# Patient Record
Sex: MALE | NOT HISPANIC OR LATINO | Employment: FULL TIME | URBAN - METROPOLITAN AREA
[De-identification: names, ages, dates, MRNs, and addresses within clinical notes are randomized per-mention and may not be internally consistent; named-entity substitution may affect disease eponyms.]

---

## 2024-07-13 ENCOUNTER — ANCILLARY PROCEDURE (OUTPATIENT)
Dept: RADIOLOGY | Facility: CLINIC | Age: 24
End: 2024-07-13
Payer: COMMERCIAL

## 2024-07-17 ENCOUNTER — OFFICE VISIT (OUTPATIENT)
Dept: FAMILY MEDICINE | Facility: CLINIC | Age: 24
End: 2024-07-17
Payer: COMMERCIAL

## 2024-07-17 VITALS
HEIGHT: 66 IN | SYSTOLIC BLOOD PRESSURE: 130 MMHG | DIASTOLIC BLOOD PRESSURE: 77 MMHG | OXYGEN SATURATION: 99 % | WEIGHT: 161.6 LBS | BODY MASS INDEX: 25.97 KG/M2 | RESPIRATION RATE: 16 BRPM | HEART RATE: 63 BPM

## 2024-07-17 DIAGNOSIS — K40.90 LEFT INGUINAL HERNIA: ICD-10-CM

## 2024-07-17 DIAGNOSIS — R07.9 CHEST PAIN, UNSPECIFIED TYPE: Primary | ICD-10-CM

## 2024-07-17 LAB
ATRIAL RATE - MUSE: 59 BPM
DIASTOLIC BLOOD PRESSURE - MUSE: NORMAL MMHG
INTERPRETATION ECG - MUSE: NORMAL
P AXIS - MUSE: 57 DEGREES
PR INTERVAL - MUSE: 152 MS
QRS DURATION - MUSE: 92 MS
QT - MUSE: 378 MS
QTC - MUSE: 374 MS
R AXIS - MUSE: 74 DEGREES
SYSTOLIC BLOOD PRESSURE - MUSE: NORMAL MMHG
T AXIS - MUSE: 27 DEGREES
VENTRICULAR RATE- MUSE: 59 BPM

## 2024-07-17 PROCEDURE — 99203 OFFICE O/P NEW LOW 30 MIN: CPT | Performed by: PHYSICIAN ASSISTANT

## 2024-07-17 PROCEDURE — 93005 ELECTROCARDIOGRAM TRACING: CPT | Performed by: PHYSICIAN ASSISTANT

## 2024-07-17 PROCEDURE — 93010 ELECTROCARDIOGRAM REPORT: CPT | Performed by: INTERNAL MEDICINE

## 2024-07-17 RX ORDER — OXYCODONE AND ACETAMINOPHEN 5; 325 MG/1; MG/1
1-2 TABLET ORAL
Status: ON HOLD | COMMUNITY
Start: 2024-07-13 | End: 2024-07-30

## 2024-07-17 ASSESSMENT — PATIENT HEALTH QUESTIONNAIRE - PHQ9
SUM OF ALL RESPONSES TO PHQ QUESTIONS 1-9: 10
SUM OF ALL RESPONSES TO PHQ QUESTIONS 1-9: 10
10. IF YOU CHECKED OFF ANY PROBLEMS, HOW DIFFICULT HAVE THESE PROBLEMS MADE IT FOR YOU TO DO YOUR WORK, TAKE CARE OF THINGS AT HOME, OR GET ALONG WITH OTHER PEOPLE: NOT DIFFICULT AT ALL

## 2024-07-17 NOTE — PROGRESS NOTES
History:  Naveed Rodriguez is a 24 year old male who was referred to general surgery by Uri Clarke PA-C for an inguinal hernia.  He presents today with complaints of a symptomatic bulge in the left groin.  The bulge has been present for at least 10 years.  It has slowly been enlarging over time.  It will reduce on its own.  It recently became more symptomatic.  He is having increased pain.  He denies having any nausea or vomiting.  He has been taking some narcotics and he is now suffering from constipation.  He has had outpatient workup including a CT.  It demonstrated a large fat-containing left inguinal hernia.    Allergies:  Patient has no known allergies.    Past Medical History:  Denies    Past Surgical History:  Some type of hernia repair at about 3 years old    Medications:    oxyCODONE-acetaminophen (PERCOCET) 5-325 MG tablet, Take 1-2 tablets by mouth, Disp: , Rfl:     Family History:  Denies a known family history of anesthesia problems    Social History:  Denies active alcohol, tobacco, marijuana, and illicit drug use    Review of Systems:   General: No complaints or constitutional symptoms  Skin: No complaints or symptoms   Hematologic/Lymphatic: No symptoms or complaints  Psychiatric: No symptoms or complaints  Endocrine: No excessive fatigue, no hypermetabolic symptoms reported  Respiratory: No cough, shortness of breath, or wheezing  Cardiovascular: No chest pain or dyspnea on exertion  Gastrointestinal: As per HPI  Musculoskeletal: No recent injuries reported  Neurological: No focal neurologic defects reported.      Exam:  /62 (BP Location: Right arm, Patient Position: Sitting)   Wt 72.6 kg (160 lb)   BMI 25.82 kg/m    Body mass index is 25.82 kg/m .  General: Alert, cooperative, appears stated age   Skin: Skin color, texture, turgor normal, no rashes or lesions   Lymphatic: No obvious adenopathy, no swelling   Eyes: No scleral icterus, pupils equal  HENT: No traumatic injury to the  head or face, no gross abnormalities  Lungs: Normal respiratory effort, breath sounds equal bilaterally  Heart: Regular rate and rhythm  Abdomen: No appreciable umbilical or right inguinal hernia.  Clear bulge in the left inguinal region that is soft and reducible when he is laying down.  Musculoskeletal: No obvious swelling  Neurologic: Grossly intact    Imaging:   Pertinent images personally reviewed by myself and discussed with the patient.    Radiology reports:  EXAM: CT ABDOMEN PELVIS W   LOCATION: [facility] [place]   DATE: 7/13/2024     INDICATION: L inguinal hernia   COMPARISON: None.   TECHNIQUE: CT scan of the abdomen and pelvis was performed following injection of IV contrast. Multiplanar reformats were obtained. Dose reduction techniques were used.   CONTRAST: IOPAMIDOL 300 MG/ML  ML BOTTLE: 100mL     FINDINGS:   LOWER CHEST: Unremarkable.   HEPATOBILIARY: Normal liver parenchyma. No biliary dilation. Normal gallbladder.   PANCREAS: Normal.   SPLEEN: Normal.   ADRENAL GLANDS: Normal.   KIDNEYS/BLADDER: Normal kidneys. No hydronephrosis. Normal urinary bladder.   BOWEL: No obstruction. Normal appendix. No bowel wall thickening or pneumatosis. No pneumoperitoneum or free fluid.   LYMPH NODES: Prominent ileocolic lymph nodes, possibly reactive.   VASCULATURE: Nonaneurysmal aorta.   PELVIC ORGANS: No pelvic masses.   MUSCULOSKELETAL: No acute osseous abnormalities. Large fat-containing left inguinal hernia.     My interpretation:  Fat-containing left inguinal hernia    Assessment/Plan:   Naveed Rodriguez is a 24 year old male with a left inguinal hernia.  I have discussed the pathophysiology of inguinal hernias at length as well as the surgical and non-operative management strategies.  In particular, the risks and benefits of repair utilizing mesh vs a primary repair.  Similarly, the risks and benefits to the surgical approach - laparoscopic vs robotic vs open inguinal hernia  surgery.  We discussed the risks of hernia surgery itself which include, but are not limited to, bleeding, infection of the mesh, recurrence of the hernia, chronic pain, poor cosmesis, the need for reoperative intervention, and injury to vital structures.  Additionally, the risks of observation were also discussed in detail which include, but are not limited to, chronic pain, enlargement of the hernia, incarceration, and strangulation.      He understands everything that was discussed and has consented to proceed with surgery.  Postoperative recovery and restrictions were discussed.  I would have him plan on being off of work for 1 week.  It will be okay for him to be on his feet and use stairs.  With the size of his hernia, he can plan on 4 weeks of lifting restrictions.  I would not want him to lift anything more than 20 pounds for the first month of his recovery.  We will plan on scheduling an open left inguinal hernia repair under MAC anesthesia at his desired date.     Sheron Razo DO  General Surgeon  [facility]  Surgery [address]  [address]  [place] [address]  Office: [phone]  Employed by - [employer] [facility] [employer]

## 2024-07-17 NOTE — PROGRESS NOTES
Assessment & Plan     Chest pain, unspecified type  Unclear of exact etiology at this time.  Discussed possible coronary artery disease however given young age, lack of family history, atypical description of chest pains, and other more potential differentials felt less likely.  EKG was obtained for baseline.  Preliminary review shows no concerns.  No signs of ischemia or obvious congenital heart defects.  Consider possible psychosomatic presentation given elevated PHQ-9 and reported stress levels.  Also consider muscular skeletal given description after he returns home and having a physically demanding job with pain worsening with movement of chest and arm at night.  Also given history of GERD, reflux is possible as well.  Given normal EKG, recommending first attempting over-the-counter either Pepcid or omeprazole for 2 to 4 weeks and if no improvement in 2 weeks, patient to return to clinic.  Discussed stress in detail but patient does not feel like he is suffering from depression or anxiety and does not elect to have any treatment at this time.  Denies any thoughts of self-harm.  - EKG 12-lead, tracing only    Left inguinal hernia  Main reason for visit today.  Exam of hernia was not obtained given CT confirming hernia within the last 4 days.  Patient is in need of a surgical referral for consultation and likely surgical repair.  Given mild to moderate pain, recommending against ongoing narcotic use.  May continue Tylenol and/or ibuprofen as needed but warned against ibuprofen in case his above symptoms are secondary to reflux.  Referral placed.  Follow-up as needed.  Otherwise recommending routine wellness visit in the next 3 to 6 months.  - Adult Gen Surg  Referral; Future      Depression Screening Follow Up        7/17/2024    10:08 AM   PHQ   PHQ-9 Total Score 10   Q9: Thoughts of better off dead/self-harm past 2 weeks Not at all         7/17/2024    10:08 AM   Last PHQ-9   1.  Little interest or  pleasure in doing things 2   2.  Feeling down, depressed, or hopeless 2   3.  Trouble falling or staying asleep, or sleeping too much 2   4.  Feeling tired or having little energy 1   5.  Poor appetite or overeating 2   6.  Feeling bad about yourself 1   7.  Trouble concentrating 0   8.  Moving slowly or restless 0   Q9: Thoughts of better off dead/self-harm past 2 weeks 0   PHQ-9 Total Score 10         Follow Up Actions Taken  Crisis resource information provided in After Visit Summary   Patient denies medication or follow-up with therapy at this time.        Chrystal Lucio is a 24 year old, presenting for the following health issues:  Hospital F/U ([facility] from 07/13/2024- pt is doing OK- was given pain medication to help with the pain)        7/17/2024    10:26 AM   Additional Questions   Roomed by Maren Lancaster CMA   Accompanied by Leighann- Mother CORY   Answers submitted by the patient for this visit:  Patient Health Questionnaire (Submitted on 7/17/2024)  If you checked off any problems, how difficult have these problems made it for you to do your work, take care of things at home, or get along with other people?: Not difficult at all  PHQ9 TOTAL SCORE: 10          ED/UC Followup:    [facility]  Date of visit: July 13, 2024  Reason for visit: Left inguinal hernia.  Current Status: Stable.  Was given Percocet which has been taking in managing discomfort.  He states the discomfort is mild to moderate.  No change in symptoms.    Of note, emergency room visit and imaging were both reviewed.  CT showed nonincarcerated left fat-containing inguinal hernia.    Patient is interested in having this repaired    Of note, patient admits to stress but denies any history of depression or anxiety.  However, does run in family.  Does not feel like he is depressed or anxious.    He does however admits to intermittent chest pains after getting off work.  He has a very physically demanding job  "and feels he has centralized chest pain radiating to his left arm which is worse with movement when he gets home from work and is relaxed.  Does admit to ongoing increased stomach acid and has not attempted any medications for this.  No known family history of coronary artery disease.  No history of nicotine smoking but has smoked cannabis in the past.  No longer smoking and has not smoked this for 1.5 weeks.  Denies pain getting worse with activity but does state seems more sore with movement of chest and arm when occurs.  No pain while at work doing activities.  No shortness of breath.  No palpitations.            Objective    /77 (BP Location: Left arm, Patient Position: Sitting, Cuff Size: Adult Regular)   Pulse 63   Resp 16   Ht 1.676 m (5' 6\")   Wt 73.3 kg (161 lb 9.6 oz)   SpO2 99%   BMI 26.08 kg/m    Body mass index is 26.08 kg/m .  Physical Exam   GENERAL: alert and no distress  RESP: lungs clear to auscultation - no rales, rhonchi or wheezes  CV: regular rates and rhythm, normal S1 S2, no S3 or S4, and no murmur, click or rub  PSYCH: mentation appears normal, affect normal/bright    EKG - Reviewed and interpreted by me appears normal, NSR, normal axis, normal intervals, no acute ST/T changes c/w ischemia, no LVH by voltage criteria, there are no prior tracings available        Signed Electronically by: Uri Clarke PA-C    "

## 2024-07-17 NOTE — H&P (VIEW-ONLY)
History:  Naveed Rodriguez is a 24 year old male who was referred to general surgery by Uri Clarke PA-C for an inguinal hernia.  He presents today with complaints of a symptomatic bulge in the left groin.  The bulge has been present for at least 10 years.  It has slowly been enlarging over time.  It will reduce on its own.  It recently became more symptomatic.  He is having increased pain.  He denies having any nausea or vomiting.  He has been taking some narcotics and he is now suffering from constipation.  He has had outpatient workup including a CT.  It demonstrated a large fat-containing left inguinal hernia.    Allergies:  Patient has no known allergies.    Past Medical History:  Denies    Past Surgical History:  Some type of hernia repair at about 3 years old    Medications:    oxyCODONE-acetaminophen (PERCOCET) 5-325 MG tablet, Take 1-2 tablets by mouth, Disp: , Rfl:     Family History:  Denies a known family history of anesthesia problems    Social History:  Denies active alcohol, tobacco, marijuana, and illicit drug use    Review of Systems:   General: No complaints or constitutional symptoms  Skin: No complaints or symptoms   Hematologic/Lymphatic: No symptoms or complaints  Psychiatric: No symptoms or complaints  Endocrine: No excessive fatigue, no hypermetabolic symptoms reported  Respiratory: No cough, shortness of breath, or wheezing  Cardiovascular: No chest pain or dyspnea on exertion  Gastrointestinal: As per HPI  Musculoskeletal: No recent injuries reported  Neurological: No focal neurologic defects reported.      Exam:  /62 (BP Location: Right arm, Patient Position: Sitting)   Wt 72.6 kg (160 lb)   BMI 25.82 kg/m    Body mass index is 25.82 kg/m .  General: Alert, cooperative, appears stated age   Skin: Skin color, texture, turgor normal, no rashes or lesions   Lymphatic: No obvious adenopathy, no swelling   Eyes: No scleral icterus, pupils equal  HENT: No traumatic injury to the  head or face, no gross abnormalities  Lungs: Normal respiratory effort, breath sounds equal bilaterally  Heart: Regular rate and rhythm  Abdomen: No appreciable umbilical or right inguinal hernia.  Clear bulge in the left inguinal region that is soft and reducible when he is laying down.  Musculoskeletal: No obvious swelling  Neurologic: Grossly intact    Imaging:   Pertinent images personally reviewed by myself and discussed with the patient.    Radiology reports:  EXAM: CT ABDOMEN PELVIS W   [facility]ATION[facility] [place]   DATE: 7/13/2024     INDICATION: L inguinal hernia   COMPARISON: None.   TECHNIQUE: CT scan of the abdomen and pelvis was performed following injection of IV contrast. Multiplanar reformats were obtained. Dose reduction techniques were used.   CONTRAST: IOPAMIDOL 300 MG/ML  ML BOTTLE: 100mL     FINDINGS:   LOWER CHEST: Unremarkable.   HEPATOBILIARY: Normal liver parenchyma. No biliary dilation. Normal gallbladder.   PANCREAS: Normal.   SPLEEN: Normal.   ADRENAL GLANDS: Normal.   KIDNEYS/BLADDER: Normal kidneys. No hydronephrosis. Normal urinary bladder.   BOWEL: No obstruction. Normal appendix. No bowel wall thickening or pneumatosis. No pneumoperitoneum or free fluid.   LYMPH NODES: Prominent ileocolic lymph nodes, possibly reactive.   VASCULATURE: Nonaneurysmal aorta.   PELVIC ORGANS: No pelvic masses.   MUSCULOSKELETAL: No acute osseous abnormalities. Large fat-containing left inguinal hernia.     My interpretation:  Fat-containing left inguinal hernia    Assessment/Plan:   Naveed Rodriguez is a 24 year old male with a left inguinal hernia.  I have discussed the pathophysiology of inguinal hernias at length as well as the surgical and non-operative management strategies.  In particular, the risks and benefits of repair utilizing mesh vs a primary repair.  Similarly, the risks and benefits to the surgical approach - laparoscopic vs robotic vs open inguinal hernia  surgery.  We discussed the risks of hernia surgery itself which include, but are not limited to, bleeding, infection of the mesh, recurrence of the hernia, chronic pain, poor cosmesis, the need for reoperative intervention, and injury to vital structures.  Additionally, the risks of observation were also discussed in detail which include, but are not limited to, chronic pain, enlargement of the hernia, incarceration, and strangulation.      He understands everything that was discussed and has consented to proceed with surgery.  Postoperative recovery and restrictions were discussed.  I would have him plan on being off of work for 1 week.  It will be okay for him to be on his feet and use stairs.  With the size of his hernia, he can plan on 4 weeks of lifting restrictions.  I would not want him to lift anything more than 20 pounds for the first month of his recovery.  We will plan on scheduling an open left inguinal hernia repair under MAC anesthesia at his desired date.     Sheron Razo DO  General Surgeon  [facility]  Surgery [address]  [address]  [place] [address]  Office: [phone]  Employed by - [facility]

## 2024-07-18 ENCOUNTER — OFFICE VISIT (OUTPATIENT)
Dept: SURGERY | Facility: CLINIC | Age: 24
End: 2024-07-18
Payer: COMMERCIAL

## 2024-07-18 VITALS — SYSTOLIC BLOOD PRESSURE: 106 MMHG | BODY MASS INDEX: 25.82 KG/M2 | DIASTOLIC BLOOD PRESSURE: 62 MMHG | WEIGHT: 160 LBS

## 2024-07-18 DIAGNOSIS — K40.90 LEFT INGUINAL HERNIA: ICD-10-CM

## 2024-07-18 PROCEDURE — 99244 OFF/OP CNSLTJ NEW/EST MOD 40: CPT | Performed by: SURGERY

## 2024-07-18 RX ORDER — CEFAZOLIN SODIUM/WATER 2 G/20 ML
2 SYRINGE (ML) INTRAVENOUS
Status: CANCELLED | OUTPATIENT
Start: 2024-07-30

## 2024-07-18 NOTE — LETTER
7/18/2024      Naveed Rodriguez  [address] [address]      Dear Colleague,    Thank you for referring your patient, Naveed Rodriguez, to the [facility] [place]. Please see a copy of my visit note below.    History:  Naveed Rodriguez is a 24 year old male who was referred to general surgery by Uri Clarke PA-C for an inguinal hernia.  He presents today with complaints of a symptomatic bulge in the left groin.  The bulge has been present for at least 10 years.  It has slowly been enlarging over time.  It will reduce on its own.  It recently became more symptomatic.  He is having increased pain.  He denies having any nausea or vomiting.  He has been taking some narcotics and he is now suffering from constipation.  He has had outpatient workup including a CT.  It demonstrated a large fat-containing left inguinal hernia.    Allergies:  Patient has no known allergies.    Past Medical History:  Denies    Past Surgical History:  Some type of hernia repair at about 3 years old    Medications:     oxyCODONE-acetaminophen (PERCOCET) 5-325 MG tablet, Take 1-2 tablets by mouth, Disp: , Rfl:     Family History:  Denies a known family history of anesthesia problems    Social History:  Denies active alcohol, tobacco, marijuana, and illicit drug use    Review of Systems:   General: No complaints or constitutional symptoms  Skin: No complaints or symptoms   Hematologic/Lymphatic: No symptoms or complaints  Psychiatric: No symptoms or complaints  Endocrine: No excessive fatigue, no hypermetabolic symptoms reported  Respiratory: No cough, shortness of breath, or wheezing  Cardiovascular: No chest pain or dyspnea on exertion  Gastrointestinal: As per HPI  Musculoskeletal: No recent injuries reported  Neurological: No focal neurologic defects reported.      Exam:  /62 (BP Location: Right arm, Patient Position: Sitting)   Wt 72.6 kg (160 lb)   BMI 25.82  kg/m    Body mass index is 25.82 kg/m .  General: Alert, cooperative, appears stated age   Skin: Skin color, texture, turgor normal, no rashes or lesions   Lymphatic: No obvious adenopathy, no swelling   Eyes: No scleral icterus, pupils equal  HENT: No traumatic injury to the head or face, no gross abnormalities  Lungs: Normal respiratory effort, breath sounds equal bilaterally  Heart: Regular rate and rhythm  Abdomen: No appreciable umbilical or right inguinal hernia.  Clear bulge in the left inguinal region that is soft and reducible when he is laying down.  Musculoskeletal: No obvious swelling  Neurologic: Grossly intact    Imaging:   Pertinent images personally reviewed by myself and discussed with the patient.    Radiology reports:  EXAM: CT ABDOMEN PELVIS W   LOCATION: [facility] [place]   DATE: 7/13/2024     INDICATION: L inguinal hernia   COMPARISON: None.   TECHNIQUE: CT scan of the abdomen and pelvis was performed following injection of IV contrast. Multiplanar reformats were obtained. Dose reduction techniques were used.   CONTRAST: IOPAMIDOL 300 MG/ML  ML BOTTLE: 100mL     FINDINGS:   LOWER CHEST: Unremarkable.   HEPATOBILIARY: Normal liver parenchyma. No biliary dilation. Normal gallbladder.   PANCREAS: Normal.   SPLEEN: Normal.   ADRENAL GLANDS: Normal.   KIDNEYS/BLADDER: Normal kidneys. No hydronephrosis. Normal urinary bladder.   BOWEL: No obstruction. Normal appendix. No bowel wall thickening or pneumatosis. No pneumoperitoneum or free fluid.   LYMPH NODES: Prominent ileocolic lymph nodes, possibly reactive.   VASCULATURE: Nonaneurysmal aorta.   PELVIC ORGANS: No pelvic masses.   MUSCULOSKELETAL: No acute osseous abnormalities. Large fat-containing left inguinal hernia.     My interpretation:  Fat-containing left inguinal hernia    Assessment/Plan:   Naveed Rodriguez is a 24 year old male with a left inguinal hernia.  I have discussed the pathophysiology of inguinal hernias  at length as well as the surgical and non-operative management strategies.  In particular, the risks and benefits of repair utilizing mesh vs a primary repair.  Similarly, the risks and benefits to the surgical approach - laparoscopic vs robotic vs open inguinal hernia surgery.  We discussed the risks of hernia surgery itself which include, but are not limited to, bleeding, infection of the mesh, recurrence of the hernia, chronic pain, poor cosmesis, the need for reoperative intervention, and injury to vital structures.  Additionally, the risks of observation were also discussed in detail which include, but are not limited to, chronic pain, enlargement of the hernia, incarceration, and strangulation.      He understands everything that was discussed and has consented to proceed with surgery.  Postoperative recovery and restrictions were discussed.  I would have him plan on being off of work for 1 week.  It will be okay for him to be on his feet and use stairs.  With the size of his hernia, he can plan on 4 weeks of lifting restrictions.  I would not want him to lift anything more than 20 pounds for the first month of his recovery.  We will plan on scheduling an open left inguinal hernia repair under MAC anesthesia at his desired date.     Sheron Razo DO  General Surgeon  [facility]  Surgery [address] [address]  Office: [phone]  Employed by - [facility]       Again, thank you for allowing me to participate in the care of your patient.        Sincerely,        Sheron Razo DO

## 2024-07-27 ENCOUNTER — HEALTH MAINTENANCE LETTER (OUTPATIENT)
Age: 24
End: 2024-07-27

## 2024-07-29 ENCOUNTER — ANESTHESIA EVENT (OUTPATIENT)
Dept: SURGERY | Facility: CLINIC | Age: 24
End: 2024-07-29
Payer: COMMERCIAL

## 2024-07-30 ENCOUNTER — HOSPITAL ENCOUNTER (OUTPATIENT)
Facility: CLINIC | Age: 24
Discharge: HOME | End: 2024-07-30
Attending: SURGERY | Admitting: SURGERY
Payer: COMMERCIAL

## 2024-07-30 ENCOUNTER — ANESTHESIA (OUTPATIENT)
Dept: SURGERY | Facility: CLINIC | Age: 24
End: 2024-07-30
Payer: COMMERCIAL

## 2024-07-30 VITALS
SYSTOLIC BLOOD PRESSURE: 115 MMHG | TEMPERATURE: 97.4 F | WEIGHT: 160 LBS | OXYGEN SATURATION: 100 % | HEIGHT: 66 IN | HEART RATE: 62 BPM | RESPIRATION RATE: 16 BRPM | DIASTOLIC BLOOD PRESSURE: 73 MMHG | BODY MASS INDEX: 25.71 KG/M2

## 2024-07-30 DIAGNOSIS — G89.18 POSTOPERATIVE PAIN: Primary | ICD-10-CM

## 2024-07-30 PROCEDURE — 258N000003 HC RX IP 258 OP 636: Performed by: STUDENT IN AN ORGANIZED HEALTH CARE EDUCATION/TRAINING PROGRAM

## 2024-07-30 PROCEDURE — 250N000011 HC RX IP 250 OP 636: Performed by: SURGERY

## 2024-07-30 PROCEDURE — 710N000012 HC RECOVERY PHASE 2, PER MINUTE: Performed by: SURGERY

## 2024-07-30 PROCEDURE — 250N000011 HC RX IP 250 OP 636: Performed by: ANESTHESIOLOGY

## 2024-07-30 PROCEDURE — C1781 MESH (IMPLANTABLE): HCPCS | Performed by: SURGERY

## 2024-07-30 PROCEDURE — 250N000009 HC RX 250: Performed by: NURSE ANESTHETIST, CERTIFIED REGISTERED

## 2024-07-30 PROCEDURE — 360N000075 HC SURGERY LEVEL 2, PER MIN: Performed by: SURGERY

## 2024-07-30 PROCEDURE — 272N000001 HC OR GENERAL SUPPLY STERILE: Performed by: SURGERY

## 2024-07-30 PROCEDURE — 258N000003 HC RX IP 258 OP 636: Performed by: NURSE ANESTHETIST, CERTIFIED REGISTERED

## 2024-07-30 PROCEDURE — 250N000011 HC RX IP 250 OP 636: Performed by: NURSE ANESTHETIST, CERTIFIED REGISTERED

## 2024-07-30 PROCEDURE — 370N000017 HC ANESTHESIA TECHNICAL FEE, PER MIN: Performed by: SURGERY

## 2024-07-30 PROCEDURE — C9290 INJ, BUPIVACAINE LIPOSOME: HCPCS | Performed by: ANESTHESIOLOGY

## 2024-07-30 PROCEDURE — 49505 PRP I/HERN INIT REDUC >5 YR: CPT | Mod: LT | Performed by: SURGERY

## 2024-07-30 PROCEDURE — 999N000141 HC STATISTIC PRE-PROCEDURE NURSING ASSESSMENT: Performed by: SURGERY

## 2024-07-30 PROCEDURE — 250N000009 HC RX 250: Performed by: SURGERY

## 2024-07-30 PROCEDURE — 250N000013 HC RX MED GY IP 250 OP 250 PS 637: Performed by: ANESTHESIOLOGY

## 2024-07-30 DEVICE — PERFIX PLUG, LARGE (CONTENTS: 2)
Type: IMPLANTABLE DEVICE | Site: ABDOMEN | Status: FUNCTIONAL
Brand: PERFIX

## 2024-07-30 RX ORDER — OXYCODONE HYDROCHLORIDE 5 MG/1
10 TABLET ORAL
Status: DISCONTINUED | OUTPATIENT
Start: 2024-07-30 | End: 2024-07-30 | Stop reason: HOSPADM

## 2024-07-30 RX ORDER — OXYCODONE HYDROCHLORIDE 5 MG/1
5 TABLET ORAL
Status: COMPLETED | OUTPATIENT
Start: 2024-07-30 | End: 2024-07-30

## 2024-07-30 RX ORDER — CEFAZOLIN SODIUM/WATER 2 G/20 ML
2 SYRINGE (ML) INTRAVENOUS
Status: COMPLETED | OUTPATIENT
Start: 2024-07-30 | End: 2024-07-30

## 2024-07-30 RX ORDER — NALOXONE HYDROCHLORIDE 0.4 MG/ML
0.1 INJECTION, SOLUTION INTRAMUSCULAR; INTRAVENOUS; SUBCUTANEOUS
Status: DISCONTINUED | OUTPATIENT
Start: 2024-07-30 | End: 2024-07-30 | Stop reason: HOSPADM

## 2024-07-30 RX ORDER — FENTANYL CITRATE 50 UG/ML
INJECTION, SOLUTION INTRAMUSCULAR; INTRAVENOUS PRN
Status: DISCONTINUED | OUTPATIENT
Start: 2024-07-30 | End: 2024-07-30

## 2024-07-30 RX ORDER — SODIUM CHLORIDE, SODIUM LACTATE, POTASSIUM CHLORIDE, CALCIUM CHLORIDE 600; 310; 30; 20 MG/100ML; MG/100ML; MG/100ML; MG/100ML
INJECTION, SOLUTION INTRAVENOUS CONTINUOUS
Status: DISCONTINUED | OUTPATIENT
Start: 2024-07-30 | End: 2024-07-30 | Stop reason: HOSPADM

## 2024-07-30 RX ORDER — BUPIVACAINE HYDROCHLORIDE 2.5 MG/ML
INJECTION, SOLUTION EPIDURAL; INFILTRATION; INTRACAUDAL PRN
Status: DISCONTINUED | OUTPATIENT
Start: 2024-07-30 | End: 2024-07-30

## 2024-07-30 RX ORDER — FENTANYL CITRATE 50 UG/ML
25 INJECTION, SOLUTION INTRAMUSCULAR; INTRAVENOUS
Status: DISCONTINUED | OUTPATIENT
Start: 2024-07-30 | End: 2024-07-30 | Stop reason: HOSPADM

## 2024-07-30 RX ORDER — FENTANYL CITRATE 50 UG/ML
25-100 INJECTION, SOLUTION INTRAMUSCULAR; INTRAVENOUS
Status: DISCONTINUED | OUTPATIENT
Start: 2024-07-30 | End: 2024-07-30 | Stop reason: HOSPADM

## 2024-07-30 RX ORDER — PROPOFOL 10 MG/ML
INJECTION, EMULSION INTRAVENOUS PRN
Status: DISCONTINUED | OUTPATIENT
Start: 2024-07-30 | End: 2024-07-30

## 2024-07-30 RX ORDER — DEXMEDETOMIDINE HYDROCHLORIDE 4 UG/ML
INJECTION, SOLUTION INTRAVENOUS
Status: DISCONTINUED
Start: 2024-07-30 | End: 2024-07-30 | Stop reason: HOSPADM

## 2024-07-30 RX ORDER — HYDROCODONE BITARTRATE AND ACETAMINOPHEN 5; 325 MG/1; MG/1
1 TABLET ORAL EVERY 4 HOURS PRN
Qty: 20 TABLET | Refills: 0 | Status: SHIPPED | OUTPATIENT
Start: 2024-07-30

## 2024-07-30 RX ORDER — ONDANSETRON 2 MG/ML
4 INJECTION INTRAMUSCULAR; INTRAVENOUS EVERY 30 MIN PRN
Status: DISCONTINUED | OUTPATIENT
Start: 2024-07-30 | End: 2024-07-30 | Stop reason: HOSPADM

## 2024-07-30 RX ORDER — ONDANSETRON 4 MG/1
4 TABLET, ORALLY DISINTEGRATING ORAL EVERY 30 MIN PRN
Status: DISCONTINUED | OUTPATIENT
Start: 2024-07-30 | End: 2024-07-30 | Stop reason: HOSPADM

## 2024-07-30 RX ORDER — LIDOCAINE 40 MG/G
CREAM TOPICAL
Status: DISCONTINUED | OUTPATIENT
Start: 2024-07-30 | End: 2024-07-30 | Stop reason: HOSPADM

## 2024-07-30 RX ORDER — DEXAMETHASONE SODIUM PHOSPHATE 4 MG/ML
4 INJECTION, SOLUTION INTRA-ARTICULAR; INTRALESIONAL; INTRAMUSCULAR; INTRAVENOUS; SOFT TISSUE
Status: DISCONTINUED | OUTPATIENT
Start: 2024-07-30 | End: 2024-07-30 | Stop reason: HOSPADM

## 2024-07-30 RX ORDER — MAGNESIUM HYDROXIDE 1200 MG/15ML
LIQUID ORAL PRN
Status: DISCONTINUED | OUTPATIENT
Start: 2024-07-30 | End: 2024-07-30 | Stop reason: HOSPADM

## 2024-07-30 RX ORDER — KETOROLAC TROMETHAMINE 30 MG/ML
30 INJECTION, SOLUTION INTRAMUSCULAR; INTRAVENOUS EVERY 6 HOURS PRN
Status: DISCONTINUED | OUTPATIENT
Start: 2024-07-30 | End: 2024-07-30 | Stop reason: HOSPADM

## 2024-07-30 RX ORDER — PROPOFOL 10 MG/ML
INJECTION, EMULSION INTRAVENOUS CONTINUOUS PRN
Status: DISCONTINUED | OUTPATIENT
Start: 2024-07-30 | End: 2024-07-30

## 2024-07-30 RX ORDER — ONDANSETRON 2 MG/ML
INJECTION INTRAMUSCULAR; INTRAVENOUS PRN
Status: DISCONTINUED | OUTPATIENT
Start: 2024-07-30 | End: 2024-07-30

## 2024-07-30 RX ADMIN — MIDAZOLAM 2 MG: 1 INJECTION INTRAMUSCULAR; INTRAVENOUS at 07:23

## 2024-07-30 RX ADMIN — FENTANYL CITRATE 50 MCG: 50 INJECTION INTRAMUSCULAR; INTRAVENOUS at 07:23

## 2024-07-30 RX ADMIN — DEXMEDETOMIDINE HYDROCHLORIDE 12 MCG: 100 INJECTION, SOLUTION INTRAVENOUS at 07:24

## 2024-07-30 RX ADMIN — ONDANSETRON 4 MG: 2 INJECTION INTRAMUSCULAR; INTRAVENOUS at 08:35

## 2024-07-30 RX ADMIN — PROPOFOL 70 MG: 10 INJECTION, EMULSION INTRAVENOUS at 07:23

## 2024-07-30 RX ADMIN — FENTANYL CITRATE 100 MCG: 50 INJECTION, SOLUTION INTRAMUSCULAR; INTRAVENOUS at 06:53

## 2024-07-30 RX ADMIN — DEXMEDETOMIDINE HYDROCHLORIDE 12 MCG: 100 INJECTION, SOLUTION INTRAVENOUS at 08:00

## 2024-07-30 RX ADMIN — SODIUM CHLORIDE, POTASSIUM CHLORIDE, SODIUM LACTATE AND CALCIUM CHLORIDE: 600; 310; 30; 20 INJECTION, SOLUTION INTRAVENOUS at 06:53

## 2024-07-30 RX ADMIN — BUPIVACAINE 10 ML: 13.3 INJECTION, SUSPENSION, LIPOSOMAL INFILTRATION at 06:57

## 2024-07-30 RX ADMIN — KETOROLAC TROMETHAMINE 15 MG: 30 INJECTION, SOLUTION INTRAMUSCULAR at 09:26

## 2024-07-30 RX ADMIN — Medication 2 G: at 07:19

## 2024-07-30 RX ADMIN — OXYCODONE 5 MG: 5 TABLET ORAL at 09:41

## 2024-07-30 RX ADMIN — PROPOFOL 150 MCG/KG/MIN: 10 INJECTION, EMULSION INTRAVENOUS at 07:23

## 2024-07-30 RX ADMIN — BUPIVACAINE HYDROCHLORIDE 20 ML: 2.5 INJECTION, SOLUTION EPIDURAL; INFILTRATION; INTRACAUDAL at 06:57

## 2024-07-30 RX ADMIN — SODIUM CHLORIDE, POTASSIUM CHLORIDE, SODIUM LACTATE AND CALCIUM CHLORIDE: 600; 310; 30; 20 INJECTION, SOLUTION INTRAVENOUS at 08:00

## 2024-07-30 RX ADMIN — MIDAZOLAM HYDROCHLORIDE 2 MG: 1 INJECTION, SOLUTION INTRAMUSCULAR; INTRAVENOUS at 06:53

## 2024-07-30 ASSESSMENT — ACTIVITIES OF DAILY LIVING (ADL)
ADLS_ACUITY_SCORE: 29

## 2024-07-30 NOTE — PROGRESS NOTES
Pt and family verbalize good understanding of discharge teach and follow up with MD.   VSS,Surgical incision CDI. D/C criteria met. Pt verbalizes readiness to go home.   Pt ambulated to rest room wit RN stand by. Pt states normal void.   Home with mother.  @[facility]@ 7/30/2024 10:17 AM

## 2024-07-30 NOTE — OP NOTE
Name:  Naveed Polk Michael  PCP:  Uri Clarke  Procedure Date:  7/30/2024      OPEN LEFT INGUINAL HERNIA REPAIR WITH MESH      Pre-Procedure Diagnosis:  Left inguinal hernia    Post-Procedure Diagnosis:    Left indirect inguinal hernia    Anesthesia Type:  MAC with TAP block    Estimated Blood Loss:   3 mL    Specimens:    None    Complications:    None apparent    Indication for procedure:  This is a 24-year-old male with a bulge in the left inguinal region.  It has been present for at least 10 years.  It has been enlarging over time.  He was diagnosed with a fat-containing inguinal hernia.  He has elected for operative intervention for treatment.    Operative Narrative:    After informed consent was obtained, TAP block performed by LAWRENCE, and the risks and benefits of the procedure were discussed, the patient was taken to the operating room and placed in a supine position.  Monitored anesthesia control was instituted by the anesthesia staff.  Preoperative antibiotics were administered and a time-out was performed.  The patient was then prepped and draped in the usual sterile manner.  An incision was made in the left inguinal region.  This was taken down through Gasper's fascia to the external oblique fascia.  The external oblique aponeurosis was incised in the direction of its fibers and lengthened to the external ring.  Flaps were created in the external oblique aponeurosis.  The ilioinguinal nerve was identified along the length of the canal.  It was freed and a neurectomy was performed to help prevent chronic postoperative pain.  The cord was circumscribed and isolated with a Penrose.  The cord structures were evaluated and an indirect hernia sac was identified along with significantly thickened cremasteric muscles.  The hernia sac was dissected away from the cord.  The hernia sac was entered and found to have omentum within.  The omentum was reduced and the hernia sac was ligated at the internal ring  and discarded.  A plug and patch mesh was obtained.  The overlay was placed along the floor of the canal parallel to the inguinal ligament.  It wrapped around the cord structures to re-create the internal ring.  The mesh was secured in place with 2-0 Ethibond to the pubic tubercle, conjoined tendon, and to the iliopubic tract.  The tails of the mesh were tucked under the external oblique fascia.  The external oblique aponeurosis was then closed in a running fashion with 3-0 Vicryl.  Gasper's fascia was closed with a running 3-0 Vicryl.  The incision was closed with interrupted 4-0 Vicryl, followed by a running subcuticular 4-0 Monocryl.  Steri-Strips and sterile dressings were applied.      Disposition:  All sponge and needle counts were correct.  The patient tolerated the procedure well and was transferred to the postanesthesia care unit in stable condition.     Sheron Razo DO  General Surgeon  [facility]  Surgery [address] [address]  Office: [phone]  Employed by - [facility] [employer] [facility] [employer]

## 2024-07-30 NOTE — ANESTHESIA POSTPROCEDURE EVALUATION
Patient: Naveed Rodriguez    Procedure: Procedure(s):  HERNIORRHAPHY, INGUINAL, OPEN       Anesthesia Type:  MAC    Note:  Disposition: Outpatient   Postop Pain Control: Uneventful            Sign Out: Well controlled pain   PONV: No   Neuro/Psych: Uneventful            Sign Out: Acceptable/Baseline neuro status   Airway/Respiratory: Uneventful            Sign Out: Acceptable/Baseline resp. status   CV/Hemodynamics: Uneventful            Sign Out: Acceptable CV status; No obvious hypovolemia; No obvious fluid overload   Other NRE:    DID A NON-ROUTINE EVENT OCCUR? No           Last vitals:  Vitals Value Taken Time   /73 07/30/24 1000   Temp 36.3  C (97.4  F) 07/30/24 1000   Pulse 62 07/30/24 1000   Resp 16 07/30/24 1000   SpO2 100 % 07/30/24 1000       Electronically Signed By: Jeanne Silvestre MD  July 30, 2024  12:13 PM

## 2024-07-30 NOTE — ANESTHESIA PREPROCEDURE EVALUATION
"Anesthesia Pre-Procedure Evaluation    Patient: Naveed Rodriguez   MRN: [id number] : 2000        Procedure : Procedure(s):  HERNIORRHAPHY, INGUINAL, OPEN          History reviewed. No pertinent past medical history.   Past Surgical History:   Procedure Laterality Date    HERNIA REPAIR      unknown type age 3-4      No Known Allergies   Social History     Tobacco Use    Smoking status: Never    Smokeless tobacco: Never   Substance Use Topics    Alcohol use: Not Currently      Wt Readings from Last 1 Encounters:   24 72.6 kg (160 lb)        Anesthesia Evaluation   Pt has had prior anesthetic.     No history of anesthetic complications       ROS/MED HX  ENT/Pulmonary:  - neg pulmonary ROS     Neurologic:  - neg neurologic ROS     Cardiovascular:  - neg cardiovascular ROS     METS/Exercise Tolerance: >4 METS    Hematologic:  - neg hematologic  ROS     Musculoskeletal: Comment: hernia      GI/Hepatic: Comment: Inguinal hernia      Renal/Genitourinary:  - neg Renal ROS     Endo:  - neg endo ROS     Psychiatric/Substance Use:  - neg psychiatric ROS     Infectious Disease:  - neg infectious disease ROS     Malignancy:       Other:            Physical Exam    Airway        Mallampati: III   TM distance: > 3 FB   Neck ROM: full   Mouth opening: > 3 cm    Respiratory Devices and Support         Dental       (+) Minor Abnormalities - some fillings, tiny chips      Cardiovascular          Rhythm and rate: regular     Pulmonary           breath sounds clear to auscultation           OUTSIDE LABS:  CBC:   Lab Results   Component Value Date    WBC 17.4 (H) 2021    HGB 17.0 2021    HCT 48.4 2021     2021     BMP:   Lab Results   Component Value Date     2021    POTASSIUM 3.6 2021    CHLORIDE 103 2021    CO2 21 (L) 2021    BUN 19 2021    CR 0.80 2021    GLC 91 2021     COAGS: No results found for: \"PTT\", \"INR\", \"FIBR\"  POC: No results " "found for: \"BGM\", \"HCG\", \"HCGS\"  HEPATIC:   Lab Results   Component Value Date    ALBUMIN 5.5 (H) 02/21/2021    PROTTOTAL 8.5 (H) 02/21/2021    ALT 19 02/21/2021    AST 27 02/21/2021    ALKPHOS 86 02/21/2021    BILITOTAL 2.0 (H) 02/21/2021     OTHER:   Lab Results   Component Value Date    CLINT 10.3 02/21/2021    LIPASE 16 02/21/2021       Anesthesia Plan    ASA Status:  1    NPO Status:  NPO Appropriate    Anesthesia Type: MAC.   Induction: N/a.   Maintenance: TIVA.        Consents    Anesthesia Plan(s) and associated risks, benefits, and realistic alternatives discussed. Questions answered and patient/representative(s) expressed understanding.     - Discussed:     - Discussed with:  Patient            Postoperative Care    Pain management: Peripheral nerve block (Single Shot).   PONV prophylaxis: Ondansetron (or other 5HT-3), Dexamethasone or Solumedrol     Comments:    Other Comments:     Propofol gtt  Robinul  Lidocaine  Zofran, decadron 4 mg  LMA prn                 Jeanne Silvestre MD                # Overweight: Estimated body mass index is 25.82 kg/m  as calculated from the following:    Height as of 7/17/24: 1.676 m (5' 6\").    Weight as of 7/18/24: 72.6 kg (160 lb).      "

## 2024-07-30 NOTE — DISCHARGE INSTRUCTIONS
You received an IV medication today called Toradol. You received this medication at 9:20am . This is a NSAID. Therefore, do not take any NSAIDS (Ibuprofen products, Advil, Motrin, etc) until 3:20 pm .          If you have any questions or concerns regarding your procedure, please contact Dr. Sheron Razo, her office number is [phone].

## 2024-07-30 NOTE — ANESTHESIA PROCEDURE NOTES
"TAP Procedure Note    Pre-Procedure   Staff -        Anesthesiologist:  Jeanne Silvestre MD       Performed By: anesthesiologist       Location: pre-op       Procedure Start/Stop Times: 7/30/2024 6:53 AM and 7/30/2024 6:57 AM       Pre-Anesthestic Checklist: patient identified, IV checked, site marked, risks and benefits discussed, informed consent, monitors and equipment checked, pre-op evaluation, at physician/surgeon's request and post-op pain management  Timeout:       Correct Patient: Yes        Correct Procedure: Yes        Correct Site: Yes        Correct Position: Yes        Correct Laterality: Yes        Site Marked: Yes  Procedure Documentation  Procedure: TAP       Laterality: left       Patient Position: supine       Patient Prep/Sterile Barriers: sterile gloves, mask       Skin prep: Chloraprep       Needle Gauge: 20.        Needle Length (Inches): 4        Ultrasound guided       1. Ultrasound was used to identify targeted nerve, plexus, vascular marker, or fascial plane and place a needle adjacent to it in real-time.       2. Ultrasound was used to visualize the spread of anesthetic in close proximity to the above referenced structure.       3. A permanent image is entered into the patient's record.       4. The visualized anatomic structures appeared normal.       5. There were no apparent abnormal pathologic findings.    Assessment/Narrative         The placement was negative for: blood aspirated and painful injection       Paresthesias: No.       Bolus given via needle..        Secured via.        Insertion/Infusion Method: Single Shot       Complications: none    Medication(s) Administered   Medication Administration Time: 7/30/2024 6:53 AM      FOR [facility] ONLY:   Pain Team Contact information: please page the Pain Team Via [employer]. Search \"Pain\". During daytime hours, please page the attending first. At night please page the resident first.      "

## 2024-07-30 NOTE — INTERVAL H&P NOTE
Patient seen in pre-op.  Denies new medical history since he was last seen.  All questions answered regarding procedure.  Consent obtained.  To the OR for open left inguinal herniorrhaphy.    Sheron Razo DO  General Surgeon  [facility]  Surgery [address] [address]  Office: [phone]  Employed by - [employer]

## 2024-07-30 NOTE — ANESTHESIA CARE TRANSFER NOTE
Patient: Naveed Rodriguez    Procedure: Procedure(s):  HERNIORRHAPHY, INGUINAL, OPEN       Diagnosis: Left inguinal hernia [K40.90]  Diagnosis Additional Information: No value filed.    Anesthesia Type:   MAC     Note:    Oropharynx: oropharynx clear of all foreign objects and spontaneously breathing  Level of Consciousness: drowsy  Oxygen Supplementation: room air    Independent Airway: airway patency satisfactory and stable  Dentition: dentition unchanged  Vital Signs Stable: post-procedure vital signs reviewed and stable  Report to RN Given: handoff report given  Patient transferred to: Phase II    Handoff Report: Identifed the Patient, Identified the Reponsible Provider, Reviewed the pertinent medical history, Discussed the surgical course, Reviewed Intra-OP anesthesia mangement and issues during anesthesia, Set expectations for post-procedure period and Allowed opportunity for questions and acknowledgement of understanding      Vitals:  Vitals Value Taken Time   /58 07/30/24 0852   Temp 36.3  C (97.4  F) 07/30/24 0850   Pulse 74 07/30/24 0852   Resp 14 07/30/24 0850   SpO2 99 % 07/30/24 0852   Vitals shown include unfiled device data.    Electronically Signed By: BARON Connors CRNA  July 30, 2024  8:54 AM

## 2024-08-12 ENCOUNTER — VIRTUAL VISIT (OUTPATIENT)
Dept: SURGERY | Facility: CLINIC | Age: 24
End: 2024-08-12
Payer: COMMERCIAL

## 2024-08-12 DIAGNOSIS — Z48.89 POSTOPERATIVE VISIT: Primary | ICD-10-CM

## 2024-08-12 PROCEDURE — 99024 POSTOP FOLLOW-UP VISIT: CPT | Mod: 93

## 2024-08-12 NOTE — PROGRESS NOTES
Telemedicine Visit: The patient's condition can be safely assessed and treated via telephone telemedicine encounter.      Reason for Telemedicine Visit: Patient has requested telehealth visit    Originating Site (Patient Location): Patient's home    Distant Site (Provider Location): [facility]    Consent:  The patient/guardian has verbally consented to: the potential risks and benefits of telemedicine (video visit) versus in person care; bill my insurance or make self-payment for services provided; and responsibility for payment of non-covered services.     Mode of Communication: telephone    As the provider I attest to compliance with applicable laws and regulations related to telemedicine.       HPI: Patient is s/p open left inguinal hernia repair with mesh with Dr. Razo on 7/30/24.  He is doing well. Pain is well controlled: Yes. No difficulties with the surgical wound.  He is eating and drinking well. Denies fever, chills, nausea, vomiting. Bowel function has returned to normal.      Assessment/Plan: Doing well after surgery and should follow up as needed. Reviewed post-operative activity restrictions with the patient and provided a note with detailed activity restrictions for patient's employer. Patient will call if further questions or documentation required.    Length of visit: Spent 5 minutes reviewing symptoms and post-op care.    Judy Rausch PA-C  [facility]  Surgery [address]  [place] [address]?  Office: [phone]

## 2024-08-12 NOTE — LETTER
[facility] [address]  [facility] [address]  Phone: [phone]  Fax: [phone]    August 12, 2024        Naveed Rordiguez  [address] [facility] [address]          To whom it may concern:    RE: Naveed Rodriguez    Patient was seen and treated today at our clinic. Patient was instructed to avoid strenuous activity and heavy lifting greater than 20 pounds for 4 weeks after surgery. (7/30/24-8/27/24). After 4 weeks time, he can slowly advance activities as tolerated.    Please contact me for questions or concerns.      Sincerely,       Judy Rausch PA-C

## 2024-09-15 NOTE — PHARMACY-ADMISSION MEDICATION HISTORY
Pharmacist Admission Medication History    Admission medication history is complete. The information provided in this note is only as accurate as the sources available at the time of the update.    Information Source(s): Patient and CareEverywhere/SureScripts via in-person    Pertinent Information:      Changes made to PTA medication list:  Added: None  Deleted: Percocet  Changed: None    Allergies reviewed with patient and updates made in EHR: yes    Medication History Completed By: Jermain Goldsmith RPH 7/30/2024 6:32 AM    No outpatient medications have been marked as taking for the 7/30/24 encounter (Hospital Encounter).       
Cane/Walker

## 2025-08-10 ENCOUNTER — HEALTH MAINTENANCE LETTER (OUTPATIENT)
Age: 25
End: 2025-08-10

## (undated) DEVICE — SUTURE VICRYL+ 2-0 27IN SH UND VCP417H

## (undated) DEVICE — GLOVE BIOGEL PI ULTRATOUCH G SZ 6.0 42160

## (undated) DEVICE — BLADE KNIFE SURG 15 371115

## (undated) DEVICE — TUBE PENROSE 3/8 X 12 STRL LTX 0912020

## (undated) DEVICE — SUCTION TIP YANKAUER W/O VENT K86

## (undated) DEVICE — SU MONOCRYL+ 4-0 18IN PS2 UND MCP496G

## (undated) DEVICE — SU ETHIBOND 2-0 SH 30" X833H

## (undated) DEVICE — DRSG TEGADERM 4X4 3/4" 1626W

## (undated) DEVICE — ELECTRODE PATIENT RETURN ADULT L10 FT 2 PLATE CORD 0855C

## (undated) DEVICE — DRSG GAUZE 4X4" 3033

## (undated) DEVICE — SU VICRYL+ 4-0 UNDYED PS-2 VCP496ZH

## (undated) DEVICE — PREP CHLORAPREP 26ML TINTED HI-LITE ORANGE 930815

## (undated) DEVICE — SU VICRYL+ 3-0 27IN SH UND VCP416H

## (undated) DEVICE — ESU ELEC BLADE 2.75" COATED/INSULATED E1455

## (undated) DEVICE — GOWN IMPERVIOUS BREATHABLE SMART LG 89015

## (undated) DEVICE — SOL WATER IRRIG 1000ML BOTTLE 2F7114

## (undated) DEVICE — ESU PENCIL SMOKE EVAC W/ROCKER SWITCH 0703-047-000

## (undated) DEVICE — SYR 10ML LL W/O NDL 302995

## (undated) DEVICE — DECANTER VIAL 2006S

## (undated) DEVICE — DRAPE OR LAPAROTOMY KC 89228*

## (undated) DEVICE — ADH LIQUID MASTISOL TOPICAL VIAL 2-3ML 0523-48

## (undated) DEVICE — CUSTOM PACK GEN MAJOR SBA5BGMHEA

## (undated) DEVICE — SOL NACL 0.9% IRRIG 1000ML BOTTLE 2F7124

## (undated) DEVICE — NEEDLE HYPO MAGELLAN SAFETY 22GA 1 1/2IN 8881850215

## (undated) RX ORDER — ONDANSETRON 2 MG/ML
INJECTION INTRAMUSCULAR; INTRAVENOUS
Status: DISPENSED
Start: 2024-07-30

## (undated) RX ORDER — LIDOCAINE HYDROCHLORIDE 10 MG/ML
INJECTION, SOLUTION EPIDURAL; INFILTRATION; INTRACAUDAL; PERINEURAL
Status: DISPENSED
Start: 2024-07-30

## (undated) RX ORDER — PROPOFOL 10 MG/ML
INJECTION, EMULSION INTRAVENOUS
Status: DISPENSED
Start: 2024-07-30

## (undated) RX ORDER — FENTANYL CITRATE 50 UG/ML
INJECTION, SOLUTION INTRAMUSCULAR; INTRAVENOUS
Status: DISPENSED
Start: 2024-07-30